# Patient Record
(demographics unavailable — no encounter records)

---

## 2025-06-18 NOTE — HISTORY OF PRESENT ILLNESS
[FreeTextEntry1] : Mr. 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 He underwent a definitive course of stereotactic body radiation therapy giving a dose of 4,000 cGy in 5 fractions from 10/16/2020 - 10/26/2020. He comes today for a routine follow-up.  The patient is feeling generally well. He is involved in all his normal activities. He denies pain or discomfort. He is urinating without difficulty, reports nocturia x 1. He has no dysuria, hematuria or incontinence. The bowel movements are regular, and there is no rectal pain, bleeding or discharge. He has developed increased erectile dysfunction. He was taking tadalafil with improvement and now sees none.

## 2025-06-18 NOTE — PHYSICAL EXAM
[Sclera] : the sclera and conjunctiva were normal [] : no respiratory distress [Heart Rate And Rhythm] : heart rate and rhythm were normal [Abdomen Soft] : soft [Nondistended] : nondistended [Normal] : oriented to person, place and time, the affect was normal, the mood was normal and not anxious

## 2025-06-18 NOTE — REVIEW OF SYSTEMS
[IPSS Score (0-40): ___] : IPSS score: [unfilled] [EPIC-CP Score (0-60): ___] : EPIC-CP score: [unfilled] [Negative] : Allergic/Immunologic [Anal Pain: Grade 0] : Anal Pain: Grade 0 [Constipation: Grade 0] : Constipation: Grade 0 [Proctitis: Grade 0] : Proctitis: Grade 0 [Rectal Pain: Grade 0] : Rectal Pain: Grade 0 [Hematuria: Grade 0] : Hematuria: Grade 0 [Urinary Incontinence: Grade 0] : Urinary Incontinence: Grade 0  [Urinary Retention: Grade 0] : Urinary Retention: Grade 0 [Urinary Tract Pain: Grade 0] : Urinary Tract Pain: Grade 0 [Urinary Urgency: Grade 0] : Urinary Urgency: Grade 0 [Urinary Frequency: Grade 0] : Urinary Frequency: Grade 0

## 2025-06-26 NOTE — HISTORY OF PRESENT ILLNESS
[FreeTextEntry1] : Mr. Chu is a 74-year-old male with prognostic group IIB favorable intermediate risk adenocarcinoma of the prostate, [place] score 3+4=7 and pre-treatment PSA of 7.8 ng/mL. He underwent a definitive course of stereotactic body radiation therapy giving a dose of 4,000 cGy in 5 fractions from 10/16/2020 - 10/26/2020. He comes today for a routine follow-up.  The patient is feeling generally well. He is involved in all his normal activities. He denies pain or discomfort. He is urinating without difficulty, nocturia 1 - 2x. He has no dysuria, hematuria or incontinence. The bowel movements are regular, and there is no rectal pain, bleeding or discharge. He continues to report erectile dysfunction without any improvement from sildenafil or tadalafil.

## 2025-06-26 NOTE — DATA REVIEWED
[FreeTextEntry1] : PSA (ng/mL)  11/30/20 6.07  03/01/21 2.59 05/17/21 1.1 12/01/21 0.89 06/06/22 0.55 09/16/22 0.70 03/30/23 0.26 10/12/23 0.26 04/24/24 0.19 05/21/25 0.14

## 2025-06-26 NOTE — DISEASE MANAGEMENT
[] : Patient had no Prostate MRI performed [MeasuredProstateVolume] : 57 [TotalCores] : 12 [TotalPositiveCores] : 2 [MaxCoreInvolvement] : 30-40% [CTresults] : US only on 3/11/2020 [TTNM] : 1c [NTNM] : 0 [MTNM] : 0

## 2025-07-21 NOTE — DISEASE MANAGEMENT
[1] : T1 [c] : c [0] : M0 [0-10] : 0 -10 ng/mL [Biopsy] : Patient had a biopsy on [7(3+4)] : Template Biopsy [place] Score: 7(3+4) [IIB] : IIB [Radiation Therapy] : Radiation Therapy [Treatment with radiation therapy] : Treatment with radiation therapy [EBRT] : EBRT [Clinical] : TNM Stage: c [] : Patient had no Prostate MRI performed [MeasuredProstateVolume] : 57 [BiopsyDate] : 3/20 [TotalCores] : 12 [TotalPositiveCores] : 2 [MaxCoreInvolvement] : 30-40% [RadiationCompletedDate] : 10/20 [EBRTDose] : 4,000 cGy [EBRTFractions] : 5 fractions [TTNM] : 1c [NTNM] : 0 [MTNM] : 0

## 2025-07-21 NOTE — PHYSICAL EXAM
[General Appearance - Well Developed] : well developed [General Appearance - Well Nourished] : well nourished [General Appearance - In No Acute Distress] : in no acute distress [] : no respiratory distress [Exaggerated Use Of Accessory Muscles For Inspiration] : no accessory muscle use [Abdomen Soft] : soft [Abdomen Tenderness] : non-tender [Normal] : no joint swelling, no clubbing or cyanosis of the fingernails and muscle strength and tone were normal [Oriented To Time, Place, And Person] : oriented to person, place, and time [Not Anxious] : not anxious [Affect] : the affect was normal

## 2025-07-21 NOTE — LETTER BODY
[Dear  ___] : Dear  [unfilled], [Consult Letter:] : I had the pleasure of evaluating your patient, [unfilled]. [Consult Closing:] : Thank you very much for allowing me to participate in the care of this patient.  If you have any questions, please do not hesitate to contact me. [FreeTextEntry1] : [facility] [address] [place], [place], [id number] [phone]

## 2025-07-21 NOTE — HISTORY OF PRESENT ILLNESS
[FreeTextEntry1] : Mr. Chu is a 76-year old man who is seen today for prostate cancer and erectile dysfunction.  He does not have significant voiding symptoms.  There is no hematuria or dysuria.  He finished radiation therapy for prostate cancer in 2020.  PSA level was 0.14 in May 2025.  He did not respond to sildenafil or tadalafil.  He is interested in intracavernosal injections.  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  He underwent a definitive course of stereotactic body radiation therapy giving a dose of 4,000 cGy in 5 fractions from 10/16/2020 - 10/26/2020.